# Patient Record
Sex: MALE | Race: WHITE | Employment: FULL TIME | URBAN - NONMETROPOLITAN AREA
[De-identification: names, ages, dates, MRNs, and addresses within clinical notes are randomized per-mention and may not be internally consistent; named-entity substitution may affect disease eponyms.]

---

## 2019-08-20 DIAGNOSIS — K43.2 VENTRAL INCISIONAL HERNIA: ICD-10-CM

## 2019-08-20 DIAGNOSIS — K57.90 DIVERTICULAR DISEASE: ICD-10-CM

## 2019-08-20 DIAGNOSIS — K85.90 ACUTE PANCREATITIS, UNSPECIFIED COMPLICATION STATUS, UNSPECIFIED PANCREATITIS TYPE: ICD-10-CM

## 2019-08-20 DIAGNOSIS — I35.9 AORTIC VALVE DISORDER: ICD-10-CM

## 2019-08-20 RX ORDER — METOPROLOL TARTRATE 50 MG/1
50 TABLET, FILM COATED ORAL 2 TIMES DAILY
COMMUNITY

## 2019-08-20 RX ORDER — PRAVASTATIN SODIUM 40 MG
40 TABLET ORAL DAILY
COMMUNITY

## 2019-08-20 RX ORDER — LEVOTHYROXINE SODIUM 0.07 MG/1
75 TABLET ORAL DAILY
COMMUNITY

## 2019-08-20 RX ORDER — WARFARIN SODIUM 2 MG/1
2 TABLET ORAL
COMMUNITY

## 2019-08-20 RX ORDER — METHOCARBAMOL 500 MG/1
500 TABLET, FILM COATED ORAL 4 TIMES DAILY
COMMUNITY

## 2019-08-20 RX ORDER — AMLODIPINE BESYLATE 2.5 MG/1
2.5 TABLET ORAL DAILY
COMMUNITY

## 2019-08-20 RX ORDER — LISINOPRIL 40 MG/1
40 TABLET ORAL DAILY
COMMUNITY

## 2019-08-20 RX ORDER — AMOXICILLIN 875 MG/1
875 TABLET, COATED ORAL 2 TIMES DAILY
COMMUNITY

## 2019-08-20 RX ORDER — LEVOTHYROXINE SODIUM 88 UG/1
88 TABLET ORAL DAILY
COMMUNITY

## 2019-08-20 RX ORDER — POLYETHYLENE GLYCOL 3350 17 G/17G
17 POWDER, FOR SOLUTION ORAL DAILY
COMMUNITY

## 2019-08-20 SDOH — HEALTH STABILITY: MENTAL HEALTH: HOW OFTEN DO YOU HAVE A DRINK CONTAINING ALCOHOL?: NEVER

## 2021-07-12 ENCOUNTER — HOSPITAL ENCOUNTER (EMERGENCY)
Age: 56
Discharge: HOME | End: 2021-07-12
Attending: EMERGENCY MEDICINE
Payer: COMMERCIAL

## 2021-07-12 ENCOUNTER — APPOINTMENT (OUTPATIENT)
Dept: GENERAL RADIOLOGY | Age: 56
End: 2021-07-12
Payer: COMMERCIAL

## 2021-07-12 ENCOUNTER — APPOINTMENT (OUTPATIENT)
Dept: CT IMAGING | Age: 56
End: 2021-07-12
Payer: COMMERCIAL

## 2021-07-12 VITALS
RESPIRATION RATE: 14 BRPM | SYSTOLIC BLOOD PRESSURE: 164 MMHG | TEMPERATURE: 98.8 F | HEART RATE: 70 BPM | WEIGHT: 270 LBS | DIASTOLIC BLOOD PRESSURE: 83 MMHG | OXYGEN SATURATION: 96 %

## 2021-07-12 DIAGNOSIS — I16.0 HYPERTENSIVE URGENCY: Primary | ICD-10-CM

## 2021-07-12 LAB
ABSOLUTE EOS #: 0.27 K/UL (ref 0–0.44)
ABSOLUTE IMMATURE GRANULOCYTE: <0.03 K/UL (ref 0–0.3)
ABSOLUTE LYMPH #: 0.95 K/UL (ref 1.1–3.7)
ABSOLUTE MONO #: 0.44 K/UL (ref 0.1–1.2)
ALBUMIN SERPL-MCNC: 4.3 G/DL (ref 3.5–5.2)
ALBUMIN/GLOBULIN RATIO: 1.4 (ref 1–2.5)
ALP BLD-CCNC: 107 U/L (ref 40–129)
ALT SERPL-CCNC: 28 U/L (ref 5–41)
ANION GAP SERPL CALCULATED.3IONS-SCNC: 9 MMOL/L (ref 9–17)
AST SERPL-CCNC: 21 U/L
BASOPHILS # BLD: 1 % (ref 0–2)
BASOPHILS ABSOLUTE: 0.03 K/UL (ref 0–0.2)
BILIRUB SERPL-MCNC: 0.51 MG/DL (ref 0.3–1.2)
BUN BLDV-MCNC: 12 MG/DL (ref 6–20)
BUN/CREAT BLD: 14 (ref 9–20)
CALCIUM SERPL-MCNC: 9.1 MG/DL (ref 8.6–10.4)
CHLORIDE BLD-SCNC: 102 MMOL/L (ref 98–107)
CO2: 26 MMOL/L (ref 20–31)
CREAT SERPL-MCNC: 0.84 MG/DL (ref 0.7–1.2)
DIFFERENTIAL TYPE: ABNORMAL
EOSINOPHILS RELATIVE PERCENT: 4 % (ref 1–4)
GFR AFRICAN AMERICAN: >60 ML/MIN
GFR NON-AFRICAN AMERICAN: >60 ML/MIN
GFR SERPL CREATININE-BSD FRML MDRD: ABNORMAL ML/MIN/{1.73_M2}
GFR SERPL CREATININE-BSD FRML MDRD: ABNORMAL ML/MIN/{1.73_M2}
GLUCOSE BLD-MCNC: 127 MG/DL (ref 70–99)
HCT VFR BLD CALC: 42.7 % (ref 40.7–50.3)
HEMOGLOBIN: 14.6 G/DL (ref 13–17)
IMMATURE GRANULOCYTES: 0 %
INR BLD: 3.3
LYMPHOCYTES # BLD: 15 % (ref 24–43)
MCH RBC QN AUTO: 28.6 PG (ref 25.2–33.5)
MCHC RBC AUTO-ENTMCNC: 34.2 G/DL (ref 25.2–33.5)
MCV RBC AUTO: 83.7 FL (ref 82.6–102.9)
MONOCYTES # BLD: 7 % (ref 3–12)
NRBC AUTOMATED: 0 PER 100 WBC
PDW BLD-RTO: 13.6 % (ref 11.8–14.4)
PLATELET # BLD: 191 K/UL (ref 138–453)
PLATELET ESTIMATE: ABNORMAL
PMV BLD AUTO: 11.7 FL (ref 8.1–13.5)
POTASSIUM SERPL-SCNC: 4.5 MMOL/L (ref 3.7–5.3)
PROTHROMBIN TIME: 31.9 SEC (ref 11.5–14.2)
RBC # BLD: 5.1 M/UL (ref 4.21–5.77)
RBC # BLD: ABNORMAL 10*6/UL
SEG NEUTROPHILS: 73 % (ref 36–65)
SEGMENTED NEUTROPHILS ABSOLUTE COUNT: 4.85 K/UL (ref 1.5–8.1)
SODIUM BLD-SCNC: 137 MMOL/L (ref 135–144)
TOTAL PROTEIN: 7.4 G/DL (ref 6.4–8.3)
TROPONIN INTERP: NORMAL
TROPONIN INTERP: NORMAL
TROPONIN T: NORMAL NG/ML
TROPONIN T: NORMAL NG/ML
TROPONIN, HIGH SENSITIVITY: 14 NG/L (ref 0–22)
TROPONIN, HIGH SENSITIVITY: 15 NG/L (ref 0–22)
WBC # BLD: 6.6 K/UL (ref 3.5–11.3)
WBC # BLD: ABNORMAL 10*3/UL

## 2021-07-12 PROCEDURE — 36415 COLL VENOUS BLD VENIPUNCTURE: CPT

## 2021-07-12 PROCEDURE — 80053 COMPREHEN METABOLIC PANEL: CPT

## 2021-07-12 PROCEDURE — 6370000000 HC RX 637 (ALT 250 FOR IP): Performed by: EMERGENCY MEDICINE

## 2021-07-12 PROCEDURE — 84484 ASSAY OF TROPONIN QUANT: CPT

## 2021-07-12 PROCEDURE — 85610 PROTHROMBIN TIME: CPT

## 2021-07-12 PROCEDURE — 71045 X-RAY EXAM CHEST 1 VIEW: CPT

## 2021-07-12 PROCEDURE — 93005 ELECTROCARDIOGRAM TRACING: CPT | Performed by: EMERGENCY MEDICINE

## 2021-07-12 PROCEDURE — 70450 CT HEAD/BRAIN W/O DYE: CPT

## 2021-07-12 PROCEDURE — 85025 COMPLETE CBC W/AUTO DIFF WBC: CPT

## 2021-07-12 PROCEDURE — 99285 EMERGENCY DEPT VISIT HI MDM: CPT

## 2021-07-12 RX ORDER — HYDRALAZINE HYDROCHLORIDE 25 MG/1
25 TABLET, FILM COATED ORAL ONCE
Status: COMPLETED | OUTPATIENT
Start: 2021-07-12 | End: 2021-07-12

## 2021-07-12 RX ORDER — HYDRALAZINE HYDROCHLORIDE 25 MG/1
25 TABLET, FILM COATED ORAL DAILY
Qty: 30 TABLET | Refills: 0 | Status: SHIPPED | OUTPATIENT
Start: 2021-07-12

## 2021-07-12 RX ORDER — CARVEDILOL 25 MG/1
25 TABLET ORAL 2 TIMES DAILY WITH MEALS
COMMUNITY

## 2021-07-12 RX ORDER — ACETAMINOPHEN 500 MG
1000 TABLET ORAL ONCE
Status: COMPLETED | OUTPATIENT
Start: 2021-07-12 | End: 2021-07-12

## 2021-07-12 RX ADMIN — ACETAMINOPHEN 1000 MG: 500 TABLET, FILM COATED ORAL at 12:10

## 2021-07-12 RX ADMIN — HYDRALAZINE HYDROCHLORIDE 25 MG: 25 TABLET, FILM COATED ORAL at 10:45

## 2021-07-12 ASSESSMENT — ENCOUNTER SYMPTOMS
NAUSEA: 0
TROUBLE SWALLOWING: 0
BACK PAIN: 0
VOMITING: 0
ABDOMINAL PAIN: 0
SORE THROAT: 0
SHORTNESS OF BREATH: 0
WHEEZING: 0
DIARRHEA: 0

## 2021-07-12 ASSESSMENT — PAIN DESCRIPTION - LOCATION: LOCATION: HEAD

## 2021-07-12 ASSESSMENT — PAIN DESCRIPTION - FREQUENCY: FREQUENCY: CONTINUOUS

## 2021-07-12 ASSESSMENT — PAIN DESCRIPTION - ORIENTATION: ORIENTATION: LEFT;ANTERIOR

## 2021-07-12 ASSESSMENT — PAIN DESCRIPTION - DESCRIPTORS: DESCRIPTORS: THROBBING

## 2021-07-12 ASSESSMENT — PAIN SCALES - GENERAL
PAINLEVEL_OUTOF10: 8
PAINLEVEL_OUTOF10: 8
PAINLEVEL_OUTOF10: 4

## 2021-07-12 ASSESSMENT — PAIN DESCRIPTION - PAIN TYPE: TYPE: ACUTE PAIN

## 2021-07-12 ASSESSMENT — PAIN DESCRIPTION - PROGRESSION: CLINICAL_PROGRESSION: NOT CHANGED

## 2021-07-12 ASSESSMENT — PAIN DESCRIPTION - ONSET: ONSET: ON-GOING

## 2021-07-12 ASSESSMENT — PAIN - FUNCTIONAL ASSESSMENT: PAIN_FUNCTIONAL_ASSESSMENT: ACTIVITIES ARE NOT PREVENTED
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
## 2021-07-14 LAB
EKG ATRIAL RATE: 67 BPM
EKG ATRIAL RATE: 69 BPM
EKG P AXIS: 51 DEGREES
EKG P AXIS: 55 DEGREES
EKG P-R INTERVAL: 214 MS
EKG P-R INTERVAL: 216 MS
EKG Q-T INTERVAL: 448 MS
EKG Q-T INTERVAL: 468 MS
EKG QRS DURATION: 168 MS
EKG QRS DURATION: 170 MS
EKG QTC CALCULATION (BAZETT): 480 MS
EKG QTC CALCULATION (BAZETT): 494 MS
EKG R AXIS: -145 DEGREES
EKG R AXIS: -46 DEGREES
EKG T AXIS: 114 DEGREES
EKG T AXIS: 70 DEGREES
EKG VENTRICULAR RATE: 67 BPM
EKG VENTRICULAR RATE: 69 BPM

## 2023-10-02 ENCOUNTER — APPOINTMENT (OUTPATIENT)
Dept: GENERAL RADIOLOGY | Age: 58
End: 2023-10-02
Payer: COMMERCIAL

## 2023-10-02 ENCOUNTER — APPOINTMENT (OUTPATIENT)
Dept: CT IMAGING | Age: 58
End: 2023-10-02
Payer: COMMERCIAL

## 2023-10-02 ENCOUNTER — HOSPITAL ENCOUNTER (OUTPATIENT)
Age: 58
Setting detail: OBSERVATION
Discharge: HOME | End: 2023-10-04
Attending: EMERGENCY MEDICINE | Admitting: STUDENT IN AN ORGANIZED HEALTH CARE EDUCATION/TRAINING PROGRAM
Payer: COMMERCIAL

## 2023-10-02 DIAGNOSIS — R55 SYNCOPE AND COLLAPSE: ICD-10-CM

## 2023-10-02 DIAGNOSIS — R07.9 CHEST PAIN, UNSPECIFIED TYPE: Primary | ICD-10-CM

## 2023-10-02 DIAGNOSIS — R07.89 ATYPICAL CHEST PAIN: ICD-10-CM

## 2023-10-02 PROBLEM — E03.8 OTHER SPECIFIED HYPOTHYROIDISM: Status: ACTIVE | Noted: 2018-03-26

## 2023-10-02 PROBLEM — I44.7 LEFT BUNDLE-BRANCH BLOCK, UNSPECIFIED: Status: ACTIVE | Noted: 2023-10-02

## 2023-10-02 LAB
ALBUMIN SERPL-MCNC: 3.7 G/DL (ref 3.5–5.2)
ALBUMIN/GLOB SERPL: 1.3 {RATIO} (ref 1–2.5)
ALP SERPL-CCNC: 86 U/L (ref 40–129)
ALT SERPL-CCNC: 15 U/L (ref 5–41)
ANION GAP SERPL CALCULATED.3IONS-SCNC: 6 MMOL/L (ref 9–17)
AST SERPL-CCNC: 16 U/L
BASOPHILS # BLD: 0.1 K/UL (ref 0–0.2)
BASOPHILS NFR BLD: 1 % (ref 0–2)
BILIRUB SERPL-MCNC: 0.3 MG/DL (ref 0.3–1.2)
BNP SERPL-MCNC: 424 PG/ML
BUN SERPL-MCNC: 25 MG/DL (ref 6–20)
CALCIUM SERPL-MCNC: 8.9 MG/DL (ref 8.6–10.4)
CHLORIDE SERPL-SCNC: 108 MMOL/L (ref 98–107)
CO2 SERPL-SCNC: 23 MMOL/L (ref 20–31)
CREAT SERPL-MCNC: 1 MG/DL (ref 0.7–1.2)
EKG ATRIAL RATE: 68 BPM
EKG P AXIS: 39 DEGREES
EKG P-R INTERVAL: 188 MS
EKG Q-T INTERVAL: 464 MS
EKG QRS DURATION: 164 MS
EKG QTC CALCULATION (BAZETT): 493 MS
EKG R AXIS: -49 DEGREES
EKG T AXIS: 112 DEGREES
EKG VENTRICULAR RATE: 68 BPM
EOSINOPHIL # BLD: 0.2 K/UL (ref 0–0.4)
EOSINOPHILS RELATIVE PERCENT: 3 % (ref 1–4)
ERYTHROCYTE [DISTWIDTH] IN BLOOD BY AUTOMATED COUNT: 15.1 % (ref 12.5–15.4)
GFR SERPL CREATININE-BSD FRML MDRD: >60 ML/MIN/1.73M2
GLUCOSE SERPL-MCNC: 152 MG/DL (ref 70–99)
HCT VFR BLD AUTO: 36.4 % (ref 41–53)
HGB BLD-MCNC: 12.3 G/DL (ref 13.5–17.5)
INR PPP: 1.3
LIPASE SERPL-CCNC: 30 U/L (ref 13–60)
LYMPHOCYTES NFR BLD: 1 K/UL (ref 1–4.8)
LYMPHOCYTES RELATIVE PERCENT: 14 % (ref 24–44)
MCH RBC QN AUTO: 29.3 PG (ref 26–34)
MCHC RBC AUTO-ENTMCNC: 33.8 G/DL (ref 31–37)
MCV RBC AUTO: 86.5 FL (ref 80–100)
MONOCYTES NFR BLD: 0.6 K/UL (ref 0.1–1.2)
MONOCYTES NFR BLD: 8 % (ref 2–11)
NEUTROPHILS NFR BLD: 74 % (ref 36–66)
NEUTS SEG NFR BLD: 5.3 K/UL (ref 1.8–7.7)
PLATELET # BLD AUTO: 180 K/UL (ref 140–450)
PMV BLD AUTO: 10.1 FL (ref 6–12)
POTASSIUM SERPL-SCNC: 5.2 MMOL/L (ref 3.7–5.3)
PROT SERPL-MCNC: 6.5 G/DL (ref 6.4–8.3)
PROTHROMBIN TIME: 13.8 SEC (ref 9.4–12.6)
RBC # BLD AUTO: 4.2 M/UL (ref 4.5–5.9)
SODIUM SERPL-SCNC: 137 MMOL/L (ref 135–144)
TROPONIN I SERPL HS-MCNC: 11 NG/L (ref 0–22)
TROPONIN I SERPL HS-MCNC: 20 NG/L (ref 0–22)
WBC OTHER # BLD: 7.1 K/UL (ref 3.5–11)

## 2023-10-02 PROCEDURE — 96361 HYDRATE IV INFUSION ADD-ON: CPT

## 2023-10-02 PROCEDURE — 99285 EMERGENCY DEPT VISIT HI MDM: CPT

## 2023-10-02 PROCEDURE — 99222 1ST HOSP IP/OBS MODERATE 55: CPT | Performed by: STUDENT IN AN ORGANIZED HEALTH CARE EDUCATION/TRAINING PROGRAM

## 2023-10-02 PROCEDURE — 80053 COMPREHEN METABOLIC PANEL: CPT

## 2023-10-02 PROCEDURE — 70450 CT HEAD/BRAIN W/O DYE: CPT

## 2023-10-02 PROCEDURE — 85025 COMPLETE CBC W/AUTO DIFF WBC: CPT

## 2023-10-02 PROCEDURE — G0378 HOSPITAL OBSERVATION PER HR: HCPCS

## 2023-10-02 PROCEDURE — 83880 ASSAY OF NATRIURETIC PEPTIDE: CPT

## 2023-10-02 PROCEDURE — 2580000003 HC RX 258: Performed by: STUDENT IN AN ORGANIZED HEALTH CARE EDUCATION/TRAINING PROGRAM

## 2023-10-02 PROCEDURE — 36415 COLL VENOUS BLD VENIPUNCTURE: CPT

## 2023-10-02 PROCEDURE — 6370000000 HC RX 637 (ALT 250 FOR IP): Performed by: STUDENT IN AN ORGANIZED HEALTH CARE EDUCATION/TRAINING PROGRAM

## 2023-10-02 PROCEDURE — 93005 ELECTROCARDIOGRAM TRACING: CPT | Performed by: EMERGENCY MEDICINE

## 2023-10-02 PROCEDURE — 83690 ASSAY OF LIPASE: CPT

## 2023-10-02 PROCEDURE — 71045 X-RAY EXAM CHEST 1 VIEW: CPT

## 2023-10-02 PROCEDURE — 85610 PROTHROMBIN TIME: CPT

## 2023-10-02 PROCEDURE — 84484 ASSAY OF TROPONIN QUANT: CPT

## 2023-10-02 PROCEDURE — 96360 HYDRATION IV INFUSION INIT: CPT

## 2023-10-02 RX ORDER — SODIUM CHLORIDE 9 MG/ML
INJECTION, SOLUTION INTRAVENOUS CONTINUOUS
Status: DISCONTINUED | OUTPATIENT
Start: 2023-10-02 | End: 2023-10-03

## 2023-10-02 RX ORDER — CARVEDILOL 12.5 MG/1
25 TABLET ORAL 2 TIMES DAILY WITH MEALS
Status: DISCONTINUED | OUTPATIENT
Start: 2023-10-02 | End: 2023-10-04 | Stop reason: HOSPADM

## 2023-10-02 RX ORDER — WARFARIN SODIUM 1 MG/1
6 TABLET ORAL ONCE
Status: COMPLETED | OUTPATIENT
Start: 2023-10-02 | End: 2023-10-02

## 2023-10-02 RX ORDER — BUMETANIDE 1 MG/1
1 TABLET ORAL DAILY
COMMUNITY

## 2023-10-02 RX ORDER — ACETAMINOPHEN 325 MG/1
650 TABLET ORAL EVERY 6 HOURS PRN
Status: DISCONTINUED | OUTPATIENT
Start: 2023-10-02 | End: 2023-10-04 | Stop reason: HOSPADM

## 2023-10-02 RX ORDER — ROSUVASTATIN CALCIUM 5 MG/1
10 TABLET, COATED ORAL NIGHTLY
Status: DISCONTINUED | OUTPATIENT
Start: 2023-10-02 | End: 2023-10-04 | Stop reason: HOSPADM

## 2023-10-02 RX ORDER — POLYETHYLENE GLYCOL 3350 17 G/17G
17 POWDER, FOR SOLUTION ORAL DAILY
Status: DISCONTINUED | OUTPATIENT
Start: 2023-10-02 | End: 2023-10-04 | Stop reason: HOSPADM

## 2023-10-02 RX ORDER — BUMETANIDE 1 MG/1
1 TABLET ORAL DAILY
Status: DISCONTINUED | OUTPATIENT
Start: 2023-10-02 | End: 2023-10-04 | Stop reason: HOSPADM

## 2023-10-02 RX ORDER — ISOSORBIDE MONONITRATE 30 MG/1
30 TABLET, EXTENDED RELEASE ORAL DAILY
COMMUNITY

## 2023-10-02 RX ORDER — POTASSIUM CHLORIDE 7.45 MG/ML
10 INJECTION INTRAVENOUS PRN
Status: DISCONTINUED | OUTPATIENT
Start: 2023-10-02 | End: 2023-10-04 | Stop reason: HOSPADM

## 2023-10-02 RX ORDER — FAMOTIDINE 20 MG/1
20 TABLET, FILM COATED ORAL 2 TIMES DAILY
Status: DISCONTINUED | OUTPATIENT
Start: 2023-10-02 | End: 2023-10-04 | Stop reason: HOSPADM

## 2023-10-02 RX ORDER — METHOCARBAMOL 500 MG/1
500 TABLET, FILM COATED ORAL 4 TIMES DAILY
Status: DISCONTINUED | OUTPATIENT
Start: 2023-10-02 | End: 2023-10-02

## 2023-10-02 RX ORDER — NITROGLYCERIN 0.4 MG/1
0.4 TABLET SUBLINGUAL EVERY 5 MIN PRN
COMMUNITY

## 2023-10-02 RX ORDER — MAGNESIUM SULFATE IN WATER 40 MG/ML
2000 INJECTION, SOLUTION INTRAVENOUS PRN
Status: DISCONTINUED | OUTPATIENT
Start: 2023-10-02 | End: 2023-10-04 | Stop reason: HOSPADM

## 2023-10-02 RX ORDER — ROSUVASTATIN CALCIUM 10 MG/1
10 TABLET, COATED ORAL DAILY
COMMUNITY

## 2023-10-02 RX ORDER — ONDANSETRON 2 MG/ML
4 INJECTION INTRAMUSCULAR; INTRAVENOUS EVERY 6 HOURS PRN
Status: DISCONTINUED | OUTPATIENT
Start: 2023-10-02 | End: 2023-10-04 | Stop reason: HOSPADM

## 2023-10-02 RX ORDER — SODIUM CHLORIDE 0.9 % (FLUSH) 0.9 %
5-40 SYRINGE (ML) INJECTION EVERY 12 HOURS SCHEDULED
Status: DISCONTINUED | OUTPATIENT
Start: 2023-10-02 | End: 2023-10-04 | Stop reason: HOSPADM

## 2023-10-02 RX ORDER — ACETAMINOPHEN 650 MG/1
650 SUPPOSITORY RECTAL EVERY 6 HOURS PRN
Status: DISCONTINUED | OUTPATIENT
Start: 2023-10-02 | End: 2023-10-04 | Stop reason: HOSPADM

## 2023-10-02 RX ORDER — NIFEDIPINE 30 MG/1
60 TABLET, EXTENDED RELEASE ORAL DAILY
COMMUNITY

## 2023-10-02 RX ORDER — SODIUM CHLORIDE 0.9 % (FLUSH) 0.9 %
5-40 SYRINGE (ML) INJECTION PRN
Status: DISCONTINUED | OUTPATIENT
Start: 2023-10-02 | End: 2023-10-04 | Stop reason: HOSPADM

## 2023-10-02 RX ORDER — WARFARIN SODIUM 1 MG/1
2 TABLET ORAL DAILY
Status: DISCONTINUED | OUTPATIENT
Start: 2023-10-02 | End: 2023-10-02

## 2023-10-02 RX ORDER — ONDANSETRON 4 MG/1
4 TABLET, ORALLY DISINTEGRATING ORAL EVERY 8 HOURS PRN
Status: DISCONTINUED | OUTPATIENT
Start: 2023-10-02 | End: 2023-10-04 | Stop reason: HOSPADM

## 2023-10-02 RX ORDER — ASPIRIN 81 MG/1
81 TABLET ORAL DAILY
Status: DISCONTINUED | OUTPATIENT
Start: 2023-10-03 | End: 2023-10-04 | Stop reason: HOSPADM

## 2023-10-02 RX ORDER — SODIUM CHLORIDE 9 MG/ML
INJECTION, SOLUTION INTRAVENOUS PRN
Status: DISCONTINUED | OUTPATIENT
Start: 2023-10-02 | End: 2023-10-04 | Stop reason: HOSPADM

## 2023-10-02 RX ORDER — NIFEDIPINE 30 MG/1
30 TABLET, EXTENDED RELEASE ORAL DAILY
Status: DISCONTINUED | OUTPATIENT
Start: 2023-10-02 | End: 2023-10-04 | Stop reason: HOSPADM

## 2023-10-02 RX ORDER — DIPHENHYDRAMINE HCL 25 MG/1
25 CAPSULE ORAL NIGHTLY PRN
COMMUNITY

## 2023-10-02 RX ORDER — LEVOTHYROXINE SODIUM 0.2 MG/1
200 TABLET ORAL DAILY
COMMUNITY

## 2023-10-02 RX ADMIN — CARVEDILOL 25 MG: 12.5 TABLET, FILM COATED ORAL at 18:17

## 2023-10-02 RX ADMIN — FAMOTIDINE 20 MG: 20 TABLET ORAL at 20:57

## 2023-10-02 RX ADMIN — ROSUVASTATIN CALCIUM 10 MG: 5 TABLET, FILM COATED ORAL at 20:57

## 2023-10-02 RX ADMIN — ACETAMINOPHEN 650 MG: 325 TABLET ORAL at 14:54

## 2023-10-02 RX ADMIN — WARFARIN SODIUM 6 MG: 1 TABLET ORAL at 18:17

## 2023-10-02 RX ADMIN — SODIUM CHLORIDE, PRESERVATIVE FREE 10 ML: 5 INJECTION INTRAVENOUS at 20:57

## 2023-10-02 RX ADMIN — SODIUM CHLORIDE: 9 INJECTION, SOLUTION INTRAVENOUS at 14:59

## 2023-10-02 ASSESSMENT — ENCOUNTER SYMPTOMS
ABDOMINAL PAIN: 0
WHEEZING: 0
CONSTIPATION: 0
DIARRHEA: 0
BACK PAIN: 0
SORE THROAT: 0
NAUSEA: 0
SHORTNESS OF BREATH: 1
TROUBLE SWALLOWING: 0
CHEST TIGHTNESS: 1
VOMITING: 0
BLOOD IN STOOL: 0

## 2023-10-02 ASSESSMENT — PAIN DESCRIPTION - DESCRIPTORS: DESCRIPTORS: ACHING

## 2023-10-02 ASSESSMENT — PAIN SCALES - GENERAL
PAINLEVEL_OUTOF10: 3
PAINLEVEL_OUTOF10: 3

## 2023-10-02 ASSESSMENT — PAIN DESCRIPTION - ORIENTATION
ORIENTATION: LEFT;ANTERIOR
ORIENTATION: MID

## 2023-10-02 ASSESSMENT — PAIN DESCRIPTION - LOCATION
LOCATION: HEAD
LOCATION: CHEST

## 2023-10-02 ASSESSMENT — PAIN - FUNCTIONAL ASSESSMENT: PAIN_FUNCTIONAL_ASSESSMENT: 0-10

## 2023-10-02 NOTE — ED PROVIDER NOTES
[facility]  eMERGENCY dEPARTMENT eNCOUnter      Pt Name: Harshad Montoya  MRN: [id number]  [address] 1965  Date of evaluation: 10/2/2023      CHIEF COMPLAINT       Chief Complaint   Patient presents with    Chest Pain     Pt arrives via ems from work dt chest pain. Pt admits to history of valve replacement x2 and chf           HISTORY OF PRESENT ILLNESS    Harshad Montoya is a 63 y.o. 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 a second time at the [facility] facility history of CHF as well  Cardiac cath was in July 2022 which showed clean coronary arteries this was done at [address]         Review of Systems   Constitutional:  Positive for diaphoresis and fatigue. Negative for chills and fever. HENT:  Negative for congestion, dental problem, sore throat and trouble swallowing. Eyes:  Negative for visual disturbance. Respiratory:  Positive for chest tightness and shortness of breath. Negative for wheezing. Cardiovascular:  Positive for chest pain. Negative for palpitations and leg swelling. Gastrointestinal:  Negative for abdominal pain, blood in stool, constipation, diarrhea, nausea and vomiting. Genitourinary:  Negative for difficulty urinating, dysuria and testicular pain. Musculoskeletal:  Negative for back pain, joint swelling and neck pain. Skin:  Negative for rash.    Neurological:  Positive for

## 2023-10-02 NOTE — PROGRESS NOTES
Physical Therapy                                                             Physical Therapy Cancel Note      DATE: 10/2/2023    NAME: Fredi Felix  MRN: [id number]   : 1965      Patient not seen this date for Physical Therapy due to:    Patient independent with functional mobility. RN reports patient independent in room. Wife confirms patient has been up independently in room and has no acute therapy needs at this time. Will defer PT evaluation at this time. Please reorder PT if future needs arise.        Electronically signed by Chasidy Dodd PT on 10/2/2023 at 3:19 PM

## 2023-10-02 NOTE — PROGRESS NOTES
Pharmacy Note  Warfarin Consult    Alena Staton is a 63 y.o. male for whom pharmacy has been consulted to manage warfarin therapy. Consulting Physician: Dr. Blanka Staley  Reason for Admission: atypical chest pain    Warfarin dose prior to admission: 4 mg daily per Mina Montiel from Dr. Sebastián Winter office, but patient presented with low INR and stated he has previously been on 4 mg 3 days weekly and 6 mg all other days    Warfarin indication: bioprosthetic heart valve  Target INR range: 2.5-3.5     Past Medical History:   Diagnosis Date    Acute pancreatitis     Aortic valve disorder     CHF (congestive heart failure) ([address])     Diverticular disease     Ventral incisional hernia                 Recent Labs     10/02/23  0847   INR 1.3     Recent Labs     10/02/23  0847   HGB 12.3*   HCT 36.4*          Current warfarin drug-drug interactions: levothyroxine      Date             INR        Dose   10/2/2023            1.3       6 mg    Daily PT/INR while inpatient. PT/INR ordered to start tomorrow. Give 6 mg dose today. Thank you for the consult. Will continue to follow.       Yuan Rasheed, Joshua  [facility]  10/2/2023 2:41 PM

## 2023-10-02 NOTE — PROGRESS NOTES
Occupational [address]  Occupational Therapy Not Seen Note    DATE: 10/2/2023    NAME: Jose J Lunsford  MRN: [id number]   : 1965      Patient not seen this date for Occupational Therapy due to:    RN reports patient independent in room. Wife confirms patient has been up independently in room and has no acute therapy needs at this time. Patient independent with ADLs and functional tasks with no acute OT needs. Will defer OT evaluation at this time. Please reorder OT if future needs arise. Next Scheduled Treatment: Discharge acute OT.     Electronically signed by GABY Tillman on 10/2/2023 at 3:16 PM

## 2023-10-02 NOTE — H&P
[facility]  Office: [address], DO, Keon Auguste, DO, Nellie Leong, DO, Mk Cynthia Hauser, DO, Napoleon Xiong MD, Diego Rachel MD, Danita Young MD, Hawa Donahue MD,  Dedrick Romero MD, Nadya Rodgers MD, Mona Bergeron, DO, Eldon Vargas MD,  Washington Montiel MD, Juliano Narvaez MD, Amado Maynard DO, Amanda Blandon MD,  Richa Horvath DO, Ruba Anderson MD, Loulou Baer MD, Diana Meyer MD, Aparna Solitario MD,  George Norman MD, David Larose MD, Iraida Nugent MD, Pepper Gary DO, Ines Reyes MD,  Amaya Lamb MD, Vicki Hood, Marcelo John, CNP, Claudina Scheuermann, CNP, Lynda Gottron, CNP,  Nicholas Martinez, [facility], Glenna Edwards, CNP, Susanne Lopez, CNP, Liyah Parikh, CNP, Renetta Alatorre, CNP, Shine Metcalf, CNP, Merline Berlin, PA-C, Benigno Estrada, TWILA, Savita Hong CNP, Trish Mohs, [address] [place] [address] Drive    HISTORY AND PHYSICAL EXAMINATION            Date:   10/2/2023  Patient name:  Gabriel Gregg  Date of admission:  10/2/2023  8:49 AM  MRN:   [id number]  Account:  [de-identified]  YOB: 1965  PCP:    Alvin Bills MD  Room:   ER05/ER05  Code Status:    No Order      History Obtained From:     patient, spouse, electronic medical record    History of Present Illness:     Gabriel Gregg is a 63 y.o. Non- / non  male who presents with Chest Pain (Pt arrives via ems from work dt chest pain. Pt admits to history of valve replacement x2 and chf/)   and is admitted to the hospital for the management of Atypical chest pain. Patient has past medical history of CHF, mechanical aortic valve on Coumadin, hypothyroidism, hypertension now presented to the ER with chest pain and shortness of breath. As per patient  he was at his work and was not feeling well. Patient stated that he usually cannot walk for long distances and gets short of breath.   Today he antihypertensive resumed, continue monitor vitals. History of mechanical aortic valve: Continue anticoagulation with Coumadin, goal INR 2.5-3.5. Continue to monitor INR, pharmacy to dose. Hypothyroidism: Continue home dose Synthroid. Hyperlipidemia: Continue statin home dose  Sleep apnea: We will order CPAP/BiPAP overnight and during daytime naps. Patient agreeable. Diet: Adult diet, will keep n.p.o. midnight. Continue IV fluids. PT/OT ordered. DVT prophylaxis: Coumadin  GI prophylaxis: Pepcid  Discharge planning: Patient will be going home once evaluated by GI.  consulted. Patient is admitted as observation status. Expected length of stay < 48 hours. Patient is admitted in the  Observation.     Medical Decision Making: Stefanie Orellana MD  10/2/2023  1:03 PM    Copy sent to Dr. Mata Lucas MD

## 2023-10-03 ENCOUNTER — APPOINTMENT (OUTPATIENT)
Dept: ULTRASOUND IMAGING | Age: 58
End: 2023-10-03
Attending: STUDENT IN AN ORGANIZED HEALTH CARE EDUCATION/TRAINING PROGRAM
Payer: COMMERCIAL

## 2023-10-03 LAB
ANION GAP SERPL CALCULATED.3IONS-SCNC: 7 MMOL/L (ref 9–17)
BUN SERPL-MCNC: 19 MG/DL (ref 6–20)
CALCIUM SERPL-MCNC: 8.8 MG/DL (ref 8.6–10.4)
CHLORIDE SERPL-SCNC: 105 MMOL/L (ref 98–107)
CO2 SERPL-SCNC: 23 MMOL/L (ref 20–31)
CREAT SERPL-MCNC: 1 MG/DL (ref 0.7–1.2)
D DIMER PPP FEU-MCNC: 0.23 UG/ML FEU
EKG ATRIAL RATE: 59 BPM
EKG P AXIS: 40 DEGREES
EKG P-R INTERVAL: 226 MS
EKG Q-T INTERVAL: 472 MS
EKG QRS DURATION: 170 MS
EKG QTC CALCULATION (BAZETT): 467 MS
EKG R AXIS: -33 DEGREES
EKG T AXIS: 137 DEGREES
EKG VENTRICULAR RATE: 59 BPM
ERYTHROCYTE [DISTWIDTH] IN BLOOD BY AUTOMATED COUNT: 14.8 % (ref 12.5–15.4)
GFR SERPL CREATININE-BSD FRML MDRD: >60 ML/MIN/1.73M2
GLUCOSE SERPL-MCNC: 132 MG/DL (ref 70–99)
HCT VFR BLD AUTO: 36.7 % (ref 41–53)
HGB BLD-MCNC: 12.7 G/DL (ref 13.5–17.5)
INR PPP: 1.7
MCH RBC QN AUTO: 29.7 PG (ref 26–34)
MCHC RBC AUTO-ENTMCNC: 34.6 G/DL (ref 31–37)
MCV RBC AUTO: 86 FL (ref 80–100)
PLATELET # BLD AUTO: 161 K/UL (ref 140–450)
PMV BLD AUTO: 10.1 FL (ref 6–12)
POTASSIUM SERPL-SCNC: 4.6 MMOL/L (ref 3.7–5.3)
PROTHROMBIN TIME: 18.4 SEC (ref 9.4–12.6)
RBC # BLD AUTO: 4.27 M/UL (ref 4.5–5.9)
SODIUM SERPL-SCNC: 135 MMOL/L (ref 135–144)
WBC OTHER # BLD: 6.8 K/UL (ref 3.5–11)

## 2023-10-03 PROCEDURE — 2580000003 HC RX 258: Performed by: STUDENT IN AN ORGANIZED HEALTH CARE EDUCATION/TRAINING PROGRAM

## 2023-10-03 PROCEDURE — 96361 HYDRATE IV INFUSION ADD-ON: CPT

## 2023-10-03 PROCEDURE — 85610 PROTHROMBIN TIME: CPT

## 2023-10-03 PROCEDURE — 85027 COMPLETE CBC AUTOMATED: CPT

## 2023-10-03 PROCEDURE — 93970 EXTREMITY STUDY: CPT

## 2023-10-03 PROCEDURE — G0378 HOSPITAL OBSERVATION PER HR: HCPCS

## 2023-10-03 PROCEDURE — 80048 BASIC METABOLIC PNL TOTAL CA: CPT

## 2023-10-03 PROCEDURE — 6370000000 HC RX 637 (ALT 250 FOR IP): Performed by: STUDENT IN AN ORGANIZED HEALTH CARE EDUCATION/TRAINING PROGRAM

## 2023-10-03 PROCEDURE — 85379 FIBRIN DEGRADATION QUANT: CPT

## 2023-10-03 PROCEDURE — 36415 COLL VENOUS BLD VENIPUNCTURE: CPT

## 2023-10-03 PROCEDURE — 99232 SBSQ HOSP IP/OBS MODERATE 35: CPT | Performed by: STUDENT IN AN ORGANIZED HEALTH CARE EDUCATION/TRAINING PROGRAM

## 2023-10-03 PROCEDURE — 96372 THER/PROPH/DIAG INJ SC/IM: CPT

## 2023-10-03 PROCEDURE — 6360000002 HC RX W HCPCS: Performed by: STUDENT IN AN ORGANIZED HEALTH CARE EDUCATION/TRAINING PROGRAM

## 2023-10-03 RX ORDER — ENOXAPARIN SODIUM 150 MG/ML
1 INJECTION SUBCUTANEOUS 2 TIMES DAILY
Status: DISCONTINUED | OUTPATIENT
Start: 2023-10-03 | End: 2023-10-04

## 2023-10-03 RX ORDER — HEPARIN SODIUM 10000 [USP'U]/100ML
5-30 INJECTION, SOLUTION INTRAVENOUS CONTINUOUS
Status: DISCONTINUED | OUTPATIENT
Start: 2023-10-03 | End: 2023-10-03

## 2023-10-03 RX ADMIN — ASPIRIN 81 MG: 81 TABLET, COATED ORAL at 10:35

## 2023-10-03 RX ADMIN — POLYETHYLENE GLYCOL 3350 17 G: 17 POWDER, FOR SOLUTION ORAL at 10:34

## 2023-10-03 RX ADMIN — ENOXAPARIN SODIUM 120 MG: 150 INJECTION SUBCUTANEOUS at 15:21

## 2023-10-03 RX ADMIN — FAMOTIDINE 20 MG: 20 TABLET ORAL at 22:18

## 2023-10-03 RX ADMIN — SODIUM CHLORIDE, PRESERVATIVE FREE 10 ML: 5 INJECTION INTRAVENOUS at 22:19

## 2023-10-03 RX ADMIN — FAMOTIDINE 20 MG: 20 TABLET ORAL at 10:35

## 2023-10-03 RX ADMIN — WARFARIN SODIUM 6 MG: 5 TABLET ORAL at 17:56

## 2023-10-03 RX ADMIN — NIFEDIPINE 30 MG: 30 TABLET, FILM COATED, EXTENDED RELEASE ORAL at 10:35

## 2023-10-03 RX ADMIN — SODIUM CHLORIDE: 9 INJECTION, SOLUTION INTRAVENOUS at 03:45

## 2023-10-03 RX ADMIN — LEVOTHYROXINE SODIUM 200 MCG: 75 TABLET ORAL at 06:05

## 2023-10-03 RX ADMIN — CARVEDILOL 25 MG: 12.5 TABLET, FILM COATED ORAL at 17:56

## 2023-10-03 RX ADMIN — CARVEDILOL 25 MG: 12.5 TABLET, FILM COATED ORAL at 10:35

## 2023-10-03 RX ADMIN — ROSUVASTATIN CALCIUM 10 MG: 5 TABLET, FILM COATED ORAL at 22:18

## 2023-10-03 RX ADMIN — ENOXAPARIN SODIUM 120 MG: 150 INJECTION SUBCUTANEOUS at 23:47

## 2023-10-03 RX ADMIN — BUMETANIDE 1 MG: 1 TABLET ORAL at 10:35

## 2023-10-03 ASSESSMENT — ENCOUNTER SYMPTOMS
CHEST TIGHTNESS: 1
GASTROINTESTINAL NEGATIVE: 1

## 2023-10-03 ASSESSMENT — PAIN SCALES - GENERAL: PAINLEVEL_OUTOF10: 0

## 2023-10-03 NOTE — CARE COORDINATION
Case Management Assessment  Initial Evaluation    Date/Time of Evaluation: 10/3/2023 12:02 PM  Assessment Completed by: Sherryle Hays, RN    If patient is discharged prior to next notation, then this note serves as note for discharge by case management. Patient Name: Sergio Ruiz                   YOB: 1965  Diagnosis: Syncope and collapse [R55]  Atypical chest pain [R07.89]  Chest pain, unspecified type [R07.9]                   Date / Time: 10/2/2023  8:49 AM    Patient Admission Status: Observation   Readmission Risk (Low < 19, Mod (19-27), High > 27): No data recorded  Current PCP: Taffy Essex, MD  PCP verified by CM? Yes    Chart Reviewed: Yes      History Provided by: Patient  Patient Orientation: Alert and Oriented    Patient Cognition: Alert    Hospitalization in the last 30 days (Readmission):  No    If yes, Readmission Assessment in  Navigator will be completed. Advance Directives:      Code Status: Full Code   Patient's Primary Decision Maker is:        Discharge Planning:    Patient lives with: Spouse/Significant Other Type of Home: House  Primary Care Giver: Self  Patient Support Systems include: Spouse/Significant Other   Current Financial resources:    Current community resources: None  Current services prior to admission: None            Current DME:              Type of Home Care services:  None    ADLS  Prior functional level: Independent in ADLs/IADLs  Current functional level: Independent in ADLs/IADLs    PT AM-PAC:   /24  OT AM-PAC:   /24    Family can provide assistance at DC: Yes  Would you like Case Management to discuss the discharge plan with any other family members/significant others, and if so, who?  No  Plans to Return to Present Housing: Yes  Other Identified Issues/Barriers to RETURNING to current housing: none  Potential Assistance needed at discharge: N/A            Potential DME:    Patient expects to discharge to: [facility] for transportation at

## 2023-10-03 NOTE — CONSULTS
[facility]    Name:   Susie Quezada :  1965   MRN:   [id number] Gender:   male   PCP:  Bashir Ibarra MD Age: 63 y.o. PCP Fax:  [phone]     Hospital:          [facility]    Encounter Date:     10/03/23      [facility]    Reason for Consult: Chest Pain and syncope    HPI: Susie Quezada is an 63 y.o. male with history of [place] aortic mechanical valve replacement, on long-term anticoagulation with warfarin, ventricular septal myectomy in  at [facility] CVA, hypertension, chronic left bundle branch block, who presented to the Emergency Center via EMS after a syncopal episode at work. Patient reports that he was walking out to the d[place]  when he developed exertional dyspnea with chest pressure, and dizziness. He reached into his pocket and took 1 sublingual nitro and then passed out. When he came to and please were standing around him and he had the dry heaves. He does not recall rapid heart rate prior to the event. In the emergency center blood pressure was 137/114 heart rate 71, 95% on room air, EKG left bundle branch block which is not new, proBNP 424, troponin negative x4, hemoglobin stable, renal function stable, however his INR was 1.3.   chest x-ray and CT of the brain negative. He was admitted for cardiology workup. He was started on IV fluids at 75 cc/hour. His Coumadin is managed per primary care. Patient underwent extensive cardiac testing in the last year   Due to ongoing shortness of breath, including a right and left heart catheterization along with a DEQUAN secondary to ongoing exertional dyspnea. Cardiac catheterization revealed normal coronary arteries, DEQUAN revealed mild perivalvular aortic valve leak, preserved LV function and right heart catheterization revealed elevated wedge pressure. His diuretics were increased at that time. This morning he has not had any further chest pain, but reports still exertional dyspnea.

## 2023-10-03 NOTE — PROGRESS NOTES
Per Cardiology and primary care, ok to feed patient. Plan is for patient to have ddimer check due to low inr on admission. Also, doppler to BLE. If both negative, patient may discharge and follow up outpatient. If positive, pt to have CTA to r/o PE.

## 2023-10-03 NOTE — PLAN OF CARE
Problem: Discharge Planning  Goal: Discharge to home or other facility with appropriate resources  10/3/2023 1506 by Masood De La Rosa RN  Outcome: Progressing  Flowsheets (Taken 10/3/2023 0830)  Discharge to home or other facility with appropriate resources: Identify barriers to discharge with patient and caregiver   Pt to discharge home once medically cleared. Problem: Pain  Goal: Verbalizes/displays adequate comfort level or baseline comfort level  10/3/2023 1506 by Masood De La Rosa RN  Outcome: Progressing  Flowsheets (Taken 10/3/2023 0813)  Verbalizes/displays adequate comfort level or baseline comfort level: Encourage patient to monitor pain and request assistance   Pt denies pain this shift. Problem: ABCDS Injury Assessment  Goal: Absence of physical injury  10/3/2023 1506 by Masood De La Rosa RN  Outcome: Progressing  Flowsheets (Taken 10/3/2023 2292)  Absence of Physical Injury: Implement safety measures based on patient assessment   No injury noted this shift. Problem: Chronic Conditions and Co-morbidities  Goal: Patient's chronic conditions and co-morbidity symptoms are monitored and maintained or improved  Outcome: Progressing  Flowsheets (Taken 10/3/2023 0830)  Care Plan - Patient's Chronic Conditions and Co-Morbidity Symptoms are Monitored and Maintained or Improved: Monitor and assess patient's chronic conditions and comorbid symptoms for stability, deterioration, or improvement   Monitoring. Problem: Safety - Adult  Goal: Free from fall injury  Outcome: Progressing   Safety maintained.

## 2023-10-03 NOTE — PROGRESS NOTES
Pharmacy Note  Warfarin Consult follow-up      Recent Labs     10/03/23  0558   INR 1.7     Recent Labs     10/02/23  0847 10/03/23  0558   HGB 12.3* 12.7*   HCT 36.4* 36.7*    161       Notes:                   Give 6 mg dose today. Daily PT/INR while inpatient.       Marylen Laurence, PharmD  [facility]  10/3/2023 7:38 AM

## 2023-10-03 NOTE — PROGRESS NOTES
Updated primary that patient vascular study was negative and that orthostatic bps were also negative via perfect serve.

## 2023-10-03 NOTE — PLAN OF CARE
Pt follows with Dr. John Guevara at [facility]. Will defer to [facility] at this time.  RN notified

## 2023-10-03 NOTE — PROGRESS NOTES
Paged DR. Garcia about orders for possible cardiac orders- pt npo currently. Ariel Almendarez CNP on call. Perfect served. Awaiting response.

## 2023-10-03 NOTE — PROGRESS NOTES
Pt is a [facility] patient. Dr. Mitchel Hudson office notified. Gilbert France CNP on call. Paged.      [phone]

## 2023-10-04 VITALS
SYSTOLIC BLOOD PRESSURE: 156 MMHG | DIASTOLIC BLOOD PRESSURE: 77 MMHG | OXYGEN SATURATION: 97 % | HEIGHT: 74 IN | RESPIRATION RATE: 18 BRPM | HEART RATE: 59 BPM | BODY MASS INDEX: 33.53 KG/M2 | WEIGHT: 261.25 LBS | TEMPERATURE: 97.7 F

## 2023-10-04 LAB
ANION GAP SERPL CALCULATED.3IONS-SCNC: 10 MMOL/L (ref 9–17)
BUN SERPL-MCNC: 15 MG/DL (ref 6–20)
CALCIUM SERPL-MCNC: 9 MG/DL (ref 8.6–10.4)
CHLORIDE SERPL-SCNC: 101 MMOL/L (ref 98–107)
CO2 SERPL-SCNC: 23 MMOL/L (ref 20–31)
CREAT SERPL-MCNC: 0.9 MG/DL (ref 0.7–1.2)
ERYTHROCYTE [DISTWIDTH] IN BLOOD BY AUTOMATED COUNT: 15.3 % (ref 12.5–15.4)
GFR SERPL CREATININE-BSD FRML MDRD: >60 ML/MIN/1.73M2
GLUCOSE SERPL-MCNC: 131 MG/DL (ref 70–99)
HCT VFR BLD AUTO: 38.6 % (ref 41–53)
HGB BLD-MCNC: 13.1 G/DL (ref 13.5–17.5)
INR PPP: 2.7
MCH RBC QN AUTO: 29.1 PG (ref 26–34)
MCHC RBC AUTO-ENTMCNC: 33.8 G/DL (ref 31–37)
MCV RBC AUTO: 86 FL (ref 80–100)
PLATELET # BLD AUTO: 165 K/UL (ref 140–450)
PMV BLD AUTO: 10.2 FL (ref 6–12)
POTASSIUM SERPL-SCNC: 4 MMOL/L (ref 3.7–5.3)
PROTHROMBIN TIME: 27.4 SEC (ref 9.4–12.6)
RBC # BLD AUTO: 4.49 M/UL (ref 4.5–5.9)
SODIUM SERPL-SCNC: 134 MMOL/L (ref 135–144)
WBC OTHER # BLD: 7.3 K/UL (ref 3.5–11)

## 2023-10-04 PROCEDURE — 85610 PROTHROMBIN TIME: CPT

## 2023-10-04 PROCEDURE — G0378 HOSPITAL OBSERVATION PER HR: HCPCS

## 2023-10-04 PROCEDURE — 6370000000 HC RX 637 (ALT 250 FOR IP): Performed by: STUDENT IN AN ORGANIZED HEALTH CARE EDUCATION/TRAINING PROGRAM

## 2023-10-04 PROCEDURE — 80048 BASIC METABOLIC PNL TOTAL CA: CPT

## 2023-10-04 PROCEDURE — 36415 COLL VENOUS BLD VENIPUNCTURE: CPT

## 2023-10-04 PROCEDURE — 2580000003 HC RX 258: Performed by: STUDENT IN AN ORGANIZED HEALTH CARE EDUCATION/TRAINING PROGRAM

## 2023-10-04 PROCEDURE — 85027 COMPLETE CBC AUTOMATED: CPT

## 2023-10-04 PROCEDURE — 99239 HOSP IP/OBS DSCHRG MGMT >30: CPT | Performed by: STUDENT IN AN ORGANIZED HEALTH CARE EDUCATION/TRAINING PROGRAM

## 2023-10-04 RX ORDER — WARFARIN SODIUM 6 MG/1
6 TABLET ORAL DAILY
Qty: 30 TABLET | Refills: 0 | Status: SHIPPED | OUTPATIENT
Start: 2023-10-05

## 2023-10-04 RX ADMIN — FAMOTIDINE 20 MG: 20 TABLET ORAL at 10:15

## 2023-10-04 RX ADMIN — BUMETANIDE 1 MG: 1 TABLET ORAL at 10:04

## 2023-10-04 RX ADMIN — CARVEDILOL 25 MG: 12.5 TABLET, FILM COATED ORAL at 10:03

## 2023-10-04 RX ADMIN — LEVOTHYROXINE SODIUM 200 MCG: 75 TABLET ORAL at 06:43

## 2023-10-04 RX ADMIN — NIFEDIPINE 30 MG: 30 TABLET, FILM COATED, EXTENDED RELEASE ORAL at 10:15

## 2023-10-04 RX ADMIN — SODIUM CHLORIDE, PRESERVATIVE FREE 10 ML: 5 INJECTION INTRAVENOUS at 10:16

## 2023-10-04 RX ADMIN — ASPIRIN 81 MG: 81 TABLET, COATED ORAL at 10:02

## 2023-10-04 NOTE — DISCHARGE INSTRUCTIONS
Please continue to take warfarin 6 mg starting from tomorrow 10-5-2023, follow-up with your primary doctor to check your INR level goal to keep INR 2.5-3.5, and follow-up with your primary doctor to adjust your blood pressure medication if needed.

## 2023-10-04 NOTE — DISCHARGE SUMMARY
[facility]  Office: [phone]  Fernando Parada DO, Pam Hauser DO, Deborah Encarnacion MD, Jack Tavera MD, Mckayla Myers MD, Karyn Strickland MD,  Mathieu Kurtz MD, Lian Xie MD, Darleen Sandhoff, DO, Brent Cavazos MD,  Juan Johnson MD, Josselin Luis MD, Brady Stoll DO, Papito Rodriguez MD,  Donald Walton DO, Bethany Clinton MD, Ty Dodson MD, Yumiko Bland MD, Emmanuel Cherry MD,  Vaughn Long MD, Hetal Villaseñor MD, Bette Hill MD, Minda Santoro MD, Chito Bynum DO, Mimi Ochoa MD,  Aung Logan MD, Dillan Bloom MD, Keyla Cheney, CNP,  Devi Jones, CNP,, Cristóbal Allan, CNP,  Loulou Pablo, [facility], Harriet Hurt, CNP, Burak Hutchinson, CNP, Dex Rose, CNP, Trixie Bedoya, CNP, [place], CNP, Dewanda Osgood, CNP, Reza Maharaj, CNS, Ktahryn Chambers, CNP, Arthur Fulton    Discharge Summary     Patient ID: Susie Quezada  :  1965   MRN: [id number]     ACCOUNT:  [de-identified]   Patient's PCP: Bashir Ibarra MD  Admit Date: 10/2/2023   Discharge Date: 10/4/2023   Length of Stay: 0  Code Status:  Full Code  Admitting Physician: Alayna Jacob MD  Discharge Physician: Ronan Jain MD     Active Discharge Diagnoses:     Hospital Problem Lists:  Principal Problem:    Atypical chest pain  Active Problems: Aortic valve disorder    Essential hypertension    H/O mechanical aortic valve replacement    Hyperlipidemia    Hypertrophic cardiomyopathy (HCC)    Other specified hypothyroidism    Left bundle-branch block, unspecified  Resolved Problems:    * No resolved hospital problems. *      Admission Condition:  good     Discharged Condition: good    Hospital Stay:     Hospital Course:   Susie Quezada is a 63 y.o.  Non- / non  male who presents with Chest Pain (Pt arrives via ems from work dt 0.4 MG SL tablet  Commonly known as: NITROSTAT     rosuvastatin 10 MG tablet  Commonly known as: CRESTOR     Unisom SleepMinis 25 MG Caps  Generic drug: diphenhydrAMINE HCl (Sleep)            STOP taking these medications      amLODIPine 2.5 MG tablet  Commonly known as: NORVASC     amoxicillin 875 MG tablet  Commonly known as: AMOXIL     hydrALAZINE 25 MG tablet  Commonly known as: APRESOLINE     methocarbamol 500 MG tablet  Commonly known as: ROBAXIN     metoprolol tartrate 50 MG tablet  Commonly known as: LOPRESSOR     polyethylene glycol 17 GM/SCOOP powder  Commonly known as: GLYCOLAX     pravastatin 40 MG tablet  Commonly known as: PRAVACHOL               Where to Get Your Medications        These medications were sent to [address] [phone]  [address] [place] [address]., [address] [place] [address]      Phone: [phone]   warfarin 6 MG tablet         No discharge procedures on file. Time Spent on discharge is  39 mins in patient examination, evaluation, counseling as well as medication reconciliation, prescriptions for required medications, discharge plan and follow up. Electronically signed by   Pedro German MD  10/4/2023  11:17 AM      Thank you Dr. Sabrina Waterman MD for the opportunity to be involved in this patient's care.

## 2023-10-04 NOTE — PROGRESS NOTES
Patient denied any chest pain or sob throughout the night. Patient did state he felt like his heart was racing for a short time in the middle of the night but resolved very quickly. Patient states he is feeling better but not quite himself yet. INR this morning is 2.7.

## 2023-10-04 NOTE — CARE COORDINATION
Perfect serve:    Are you going to provide the patient with a script for a sleep study? I confirmed with patient that he has not had a sleep study done. I am unable able to order any cpap/bipap without a diagonsis of VERENICE and or COPD. Once he has confirmed need the sleep center will set up his cpap/bipap.  Milka      Perfect serve response:    10/4/23 11:52 AM   I asked him to follow with his PCP to give him referral to see pulmonologist   10/4/23 11:53 AM   He need to be evaluated by pulmonologist

## 2023-10-04 NOTE — PROGRESS NOTES
Pharmacy Note  Warfarin Consult follow-up      Recent Labs     10/04/23  0556   INR 2.7     Recent Labs     10/02/23  0847 10/03/23  0558 10/04/23  0556   HGB 12.3* 12.7* 13.1*   HCT 36.4* 36.7* 38.6*    161 165         Notes: INR with significant jump from 1.7 to 2.7 - this is therapeutic, however with large jump in INR will plan on HOLDING warfarin this evening, 10/4/23 and will evaluate again tomorrow with AM labs                    Daily PT/INR while inpatient. Kelsi Tse,   PharmD  10/4/2023 7:34 AM

## 2023-10-04 NOTE — PROGRESS NOTES
Pt discharged via wheelchair with all belongings, scripts and discharge paperwork. Follow up appointments and discharge instructions reviewed with pt and spouse. All questions answered to satisfaction.

## 2023-10-04 NOTE — PLAN OF CARE
Problem: Discharge Planning  Goal: Discharge to home or other facility with appropriate resources  10/3/2023 1506 by Merlin Michelle RN  Outcome: Progressing  Flowsheets (Taken 10/3/2023 0830)  Discharge to home or other facility with appropriate resources: Identify barriers to discharge with patient and caregiver     Problem: Pain  Goal: Verbalizes/displays adequate comfort level or baseline comfort level  Recent Flowsheet Documentation  Taken 10/3/2023 1559 by Merlin Michelle RN  Verbalizes/displays adequate comfort level or baseline comfort level: Encourage patient to monitor pain and request assistance  10/3/2023 1506 by Merlin Michelle RN  Outcome: Progressing  Flowsheets (Taken 10/3/2023 0813)  Verbalizes/displays adequate comfort level or baseline comfort level: Encourage patient to monitor pain and request assistance     Problem: ABCDS Injury Assessment  Goal: Absence of physical injury  10/3/2023 1506 by Merlin Michelle RN  Outcome: Progressing  Flowsheets (Taken 10/3/2023 4681)  Absence of Physical Injury: Implement safety measures based on patient assessment     Problem: Chronic Conditions and Co-morbidities  Goal: Patient's chronic conditions and co-morbidity symptoms are monitored and maintained or improved  10/3/2023 1506 by Merlin Michelle RN  Outcome: Progressing  Flowsheets (Taken 10/3/2023 0830)  Care Plan - Patient's Chronic Conditions and Co-Morbidity Symptoms are Monitored and Maintained or Improved: Monitor and assess patient's chronic conditions and comorbid symptoms for stability, deterioration, or improvement     Problem: Safety - Adult  Goal: Free from fall injury  10/3/2023 1506 by Merlin Michelle RN  Outcome: Progressing